# Patient Record
Sex: FEMALE | Race: BLACK OR AFRICAN AMERICAN | NOT HISPANIC OR LATINO | Employment: FULL TIME | URBAN - METROPOLITAN AREA
[De-identification: names, ages, dates, MRNs, and addresses within clinical notes are randomized per-mention and may not be internally consistent; named-entity substitution may affect disease eponyms.]

---

## 2017-04-07 PROBLEM — E28.39 PREMATURE OVARIAN FAILURE: Status: ACTIVE | Noted: 2017-04-07

## 2017-04-07 PROBLEM — Q96.9 TURNER'S SYNDROME: Status: ACTIVE | Noted: 2017-04-07

## 2017-09-22 ENCOUNTER — HOSPITAL ENCOUNTER (EMERGENCY)
Facility: OTHER | Age: 22
Discharge: HOME | End: 2017-09-22
Attending: EMERGENCY MEDICINE
Payer: MEDICAID

## 2017-09-22 VITALS
RESPIRATION RATE: 17 BRPM | HEART RATE: 118 BPM | DIASTOLIC BLOOD PRESSURE: 87 MMHG | BODY MASS INDEX: 27.82 KG/M2 | HEIGHT: 59 IN | TEMPERATURE: 97 F | OXYGEN SATURATION: 100 % | WEIGHT: 138 LBS | SYSTOLIC BLOOD PRESSURE: 123 MMHG

## 2017-09-22 DIAGNOSIS — J06.9 UPPER RESPIRATORY TRACT INFECTION, UNSPECIFIED TYPE: Primary | ICD-10-CM

## 2017-09-22 PROCEDURE — 99283 EMERGENCY DEPT VISIT LOW MDM: CPT

## 2017-09-22 RX ORDER — ONDANSETRON 4 MG/1
4 TABLET, ORALLY DISINTEGRATING ORAL EVERY 6 HOURS PRN
Qty: 10 TABLET | Refills: 0 | Status: SHIPPED | OUTPATIENT
Start: 2017-09-22 | End: 2020-05-19 | Stop reason: CLARIF

## 2017-09-22 NOTE — ED PROVIDER NOTES
Encounter Date: 9/22/2017       History     Chief Complaint   Patient presents with    Nausea     pt c/o nasuea and fever pt did not take temp. pt is afebrile in triage. she reports taking OTC advil last night      The history is provided by the patient.   URI   The primary symptoms include fever, sore throat, cough and nausea. Primary symptoms do not include fatigue, headaches, ear pain, swollen glands, wheezing, abdominal pain, vomiting, myalgias, arthralgias or rash. The current episode started yesterday. This is a new problem. The problem has not changed since onset.The fever began today. The fever has been unchanged since its onset. The fever has been present for less than 1 day. The temperature was taken by a tympanic thermometer. The maximum temperature recorded prior to her arrival was 100 to 100.9 F.   The sore throat began today. The sore throat has been unchanged since its onset. The sore throat is not accompanied by trouble swallowing, drooling, hoarse voice or stridor. The sore throat pain is at a severity of 1/10.     The cough began today. The cough is non-productive.   Nausea began today.   The onset of the illness is associated with exposure to sick contacts. Symptoms associated with the illness include chills and congestion. The illness is not associated with plugged ear sensation, facial pain or sinus pressure. The following treatments were addressed: NSAIDs were effective.     Review of patient's allergies indicates:  No Known Allergies  History reviewed. No pertinent past medical history.  History reviewed. No pertinent surgical history.  Family History   Problem Relation Age of Onset    Breast cancer Neg Hx     Colon cancer Neg Hx     Ovarian cancer Neg Hx      Social History   Substance Use Topics    Smoking status: Never Smoker    Smokeless tobacco: Never Used    Alcohol use Not on file     Review of Systems   Constitutional: Positive for chills and fever. Negative for fatigue.   HENT:  Positive for congestion and sore throat. Negative for drooling, ear pain, hoarse voice, sinus pressure and trouble swallowing.    Eyes: Negative.    Respiratory: Positive for cough. Negative for wheezing and stridor.    Cardiovascular: Negative.    Gastrointestinal: Positive for nausea. Negative for abdominal pain and vomiting.   Endocrine: Negative.    Genitourinary: Negative.    Musculoskeletal: Negative.  Negative for arthralgias and myalgias.   Skin: Negative.  Negative for rash.   Allergic/Immunologic: Negative.    Neurological: Negative.  Negative for headaches.   Hematological: Negative.    Psychiatric/Behavioral: Negative.    All other systems reviewed and are negative.      Physical Exam     Initial Vitals   BP Pulse Resp Temp SpO2   09/22/17 1010 09/22/17 1010 09/22/17 1010 09/22/17 1008 09/22/17 1008   123/87 (!) 118 17 97.2 °F (36.2 °C) 100 %      MAP       09/22/17 1010       99         Physical Exam    Nursing note and vitals reviewed.  Constitutional: Vital signs are normal. She is Obese . She is active and cooperative.   HENT:   Head: Normocephalic and atraumatic.   Eyes: Conjunctivae, EOM and lids are normal. Pupils are equal, round, and reactive to light.   Neck: Trachea normal and full passive range of motion without pain. Neck supple. No thyroid mass present.   Cardiovascular: Normal rate, regular rhythm, S1 normal, S2 normal, normal heart sounds, intact distal pulses and normal pulses.   Pulmonary/Chest: Effort normal and breath sounds normal.   Abdominal: Soft. Normal appearance, normal aorta and bowel sounds are normal. There is no hepatosplenomegaly. There is no tenderness. There is no rigidity, no rebound, no guarding, no CVA tenderness, no tenderness at McBurney's point and negative Hawthorne's sign. No hernia.       Musculoskeletal: Normal range of motion.   Lymphadenopathy:     She has no axillary adenopathy.   Neurological: She is alert and oriented to person, place, and time.   Skin: Skin  is warm, dry and intact.   Psychiatric: She has a normal mood and affect. Her speech is normal and behavior is normal. Judgment and thought content normal. Cognition and memory are normal.         ED Course   Procedures  Labs Reviewed - No data to display                            ED Course      Clinical Impression:   The encounter diagnosis was Upper respiratory tract infection, unspecified type.                           Mayo Mckenzie MD  09/22/17 4167

## 2019-01-31 ENCOUNTER — HOSPITAL ENCOUNTER (EMERGENCY)
Facility: HOSPITAL | Age: 24
Discharge: HOME | End: 2019-01-31
Attending: EMERGENCY MEDICINE

## 2019-01-31 VITALS
DIASTOLIC BLOOD PRESSURE: 92 MMHG | HEIGHT: 58 IN | BODY MASS INDEX: 32.12 KG/M2 | TEMPERATURE: 98 F | WEIGHT: 153 LBS | OXYGEN SATURATION: 100 % | HEART RATE: 73 BPM | RESPIRATION RATE: 17 BRPM | SYSTOLIC BLOOD PRESSURE: 130 MMHG

## 2019-01-31 DIAGNOSIS — H18.822 CORNEAL ABRASION OF LEFT EYE DUE TO CONTACT LENS: Primary | ICD-10-CM

## 2019-01-31 LAB
B-HCG UR QL: NEGATIVE
CTP QC/QA: YES

## 2019-01-31 PROCEDURE — 99283 EMERGENCY DEPT VISIT LOW MDM: CPT | Mod: 25,ER

## 2019-01-31 PROCEDURE — 25000003 PHARM REV CODE 250: Mod: ER | Performed by: EMERGENCY MEDICINE

## 2019-01-31 PROCEDURE — 81025 URINE PREGNANCY TEST: CPT | Mod: ER | Performed by: EMERGENCY MEDICINE

## 2019-01-31 RX ORDER — SULFACETAMIDE SODIUM 100 MG/ML
2 SOLUTION/ DROPS OPHTHALMIC ONCE
Status: DISCONTINUED | OUTPATIENT
Start: 2019-01-31 | End: 2019-01-31

## 2019-01-31 RX ORDER — PROPARACAINE HYDROCHLORIDE 5 MG/ML
1 SOLUTION/ DROPS OPHTHALMIC
Status: COMPLETED | OUTPATIENT
Start: 2019-01-31 | End: 2019-01-31

## 2019-01-31 RX ORDER — CYCLOPENTOLATE HYDROCHLORIDE 10 MG/ML
2 SOLUTION/ DROPS OPHTHALMIC
Status: COMPLETED | OUTPATIENT
Start: 2019-01-31 | End: 2019-01-31

## 2019-01-31 RX ORDER — SULFACETAMIDE SODIUM 100 MG/ML
2 SOLUTION/ DROPS OPHTHALMIC ONCE
Status: COMPLETED | OUTPATIENT
Start: 2019-01-31 | End: 2019-01-31

## 2019-01-31 RX ADMIN — PROPARACAINE HYDROCHLORIDE 1 DROP: 5 SOLUTION/ DROPS OPHTHALMIC at 12:01

## 2019-01-31 RX ADMIN — FLUORESCEIN SODIUM 1 EACH: 1 STRIP OPHTHALMIC at 12:01

## 2019-01-31 RX ADMIN — CYCLOPENTOLATE HYDROCHLORIDE 2 DROP: 10 SOLUTION/ DROPS OPHTHALMIC at 12:01

## 2019-01-31 RX ADMIN — SULFACETAMIDE SODIUM 2 DROP: 100 SOLUTION/ DROPS OPHTHALMIC at 12:01

## 2019-01-31 NOTE — DISCHARGE INSTRUCTIONS
Do not rub eye.  Wear sunglasses.  Use 1 drop of the cyclopentolate daily for 2 days.  Use 2 drops of the sulfacetamide eyedrops 4 times a day for 3 days

## 2019-01-31 NOTE — ED PROVIDER NOTES
Encounter Date: 1/31/2019    SCRIBE #1 NOTE: I, Tyson Rubio, am scribing for, and in the presence of,  Dr. Smith. I have scribed the following portions of the note - Other sections scribed: HPI, ROS, PE.       History     Chief Complaint   Patient presents with    Eye Problem     Patient reports left eye redness and drainage that began last night after leaving her contact lense in. States it was crusted over this morning.      This is a 23 y.o. female who presents to the ED with a complaint of left eye redness that began last night. Pt also reports clear drainage to the left eye and endorses a foreign body sensation. She does not have pain to her left eye. She denies visual disturbance or photophobia. Pt suspects she left her cosmetic contact in too long. She is supposed to leave the contact in for one day, but notes she had her contact in for two days. She has not put her contact back in since her eye problem began.      The history is provided by the patient.     Review of patient's allergies indicates:  No Known Allergies  History reviewed. No pertinent past medical history.  History reviewed. No pertinent surgical history.  Family History   Problem Relation Age of Onset    Breast cancer Neg Hx     Colon cancer Neg Hx     Ovarian cancer Neg Hx      Social History     Tobacco Use    Smoking status: Never Smoker    Smokeless tobacco: Never Used   Substance Use Topics    Alcohol use: Not on file    Drug use: Not on file     Review of Systems   Constitutional: Negative for fever.   HENT: Negative for rhinorrhea.    Eyes: Positive for discharge and redness. Negative for photophobia, pain and visual disturbance.   Neurological: Negative for headaches.       Physical Exam     Initial Vitals [01/31/19 1119]   BP Pulse Resp Temp SpO2   (!) 115/46 83 17 99.1 °F (37.3 °C) 100 %      MAP       --         Physical Exam    Nursing note and vitals reviewed.  Constitutional: She appears well-developed and  well-nourished. No distress.   HENT:   Head: Normocephalic and atraumatic.   Eyes: EOM are normal. Pupils are equal, round, and reactive to light. Lids are everted and swept, no foreign bodies found. Left conjunctiva is injected.   Slit lamp exam:       The left eye shows corneal abrasion and fluorescein uptake.       Neck: Normal range of motion. Neck supple.   Pulmonary/Chest: Effort normal. No respiratory distress.   Musculoskeletal: Normal range of motion.   Neurological: She is alert and oriented to person, place, and time.   Skin: Skin is warm and dry.   Psychiatric: She has a normal mood and affect.         ED Course   Procedures  Labs Reviewed   POCT URINE PREGNANCY          Imaging Results    None          Medical Decision Making:   Initial Assessment:   This is a 23 y.o. female who presents to the ED with a complaint of let eye pain and redness with clear discharge.    The pt's physical examination is significant for a corneal abrasion to the left eye and fluorescin uptake to the left eye.  Clinical Tests:   Lab Tests: Ordered  ED Management:  I will order a UPT and visual acuity screening.    Patient will be treated with sulfacetamide, cyclopentolate, proparacaine, and fluorescein.  Patient understands that she must follow up with an eye doctor within 24 hr            Scribe Attestation:   Scribe #1: I performed the above scribed service and the documentation accurately describes the services I performed. I attest to the accuracy of the note.       I, Dr. Lorna Smith, personally performed the services described in this documentation. All medical record entries made by the scribe were at my direction and in my presence.  I have reviewed the chart and agree that the record reflects my personal performance and is accurate and complete. Lorna Smith MD.  2:40 PM 01/31/2019             Clinical Impression:     1. Corneal abrasion of left eye due to contact lens                                   Lorna WALKER  MD Sarah  01/31/19 1750

## 2020-02-12 ENCOUNTER — HOSPITAL ENCOUNTER (EMERGENCY)
Facility: HOSPITAL | Age: 25
Discharge: HOME | End: 2020-02-12
Attending: INTERNAL MEDICINE
Payer: MEDICAID

## 2020-02-12 VITALS
DIASTOLIC BLOOD PRESSURE: 76 MMHG | SYSTOLIC BLOOD PRESSURE: 117 MMHG | WEIGHT: 115 LBS | TEMPERATURE: 98 F | RESPIRATION RATE: 18 BRPM | HEIGHT: 57 IN | OXYGEN SATURATION: 99 % | HEART RATE: 83 BPM | BODY MASS INDEX: 24.81 KG/M2

## 2020-02-12 DIAGNOSIS — R51.9 ACUTE NONINTRACTABLE HEADACHE, UNSPECIFIED HEADACHE TYPE: Primary | ICD-10-CM

## 2020-02-12 LAB
B-HCG UR QL: NEGATIVE
BILIRUBIN, POC UA: NEGATIVE
BLOOD, POC UA: NEGATIVE
CLARITY, POC UA: CLEAR
COLOR, POC UA: YELLOW
CTP QC/QA: YES
GLUCOSE, POC UA: NEGATIVE
KETONES, POC UA: NEGATIVE
LEUKOCYTE EST, POC UA: NEGATIVE
NITRITE, POC UA: NEGATIVE
PH UR STRIP: 7.5 [PH]
PROTEIN, POC UA: NEGATIVE
SPECIFIC GRAVITY, POC UA: 1.02
UROBILINOGEN, POC UA: 1 E.U./DL

## 2020-02-12 PROCEDURE — 99282 EMERGENCY DEPT VISIT SF MDM: CPT | Mod: ER

## 2020-02-12 PROCEDURE — 81003 URINALYSIS AUTO W/O SCOPE: CPT | Mod: ER

## 2020-02-12 PROCEDURE — 81025 URINE PREGNANCY TEST: CPT | Mod: ER | Performed by: NURSE PRACTITIONER

## 2020-02-12 NOTE — ED PROVIDER NOTES
Encounter Date: 2/12/2020    SCRIBE #1 NOTE: I, Suzanna Nolasco, am scribing for, and in the presence of,  ANGELO Deshpande. I have scribed the following portions of the note - Other sections scribed: HPI, ROS, PE.       History     Chief Complaint   Patient presents with    Headache     I keep getting headache's ,  this one started this morning.       Rivka Smith is a 24 y.o. female who presents to the ED complaining of intermittent right-sided frontal headache. Pt states her last episode was this morning that has resolved. She has been drinking apple cider vinegar and dieting in an effort to lose weight. She is in no pain at this time. Patient denies fever, fatigue, chest pain, shortness of breath, abdominal pain, nausea, vomiting, diarrhea, dysuria, hematuria, rash, numbness, weakness, tingling, or any additional complaints.  Patient denies pain at present and has not taken any medications at present.      The history is provided by the patient.     Review of patient's allergies indicates:  No Known Allergies  No past medical history on file.  No past surgical history on file.  Family History   Problem Relation Age of Onset    Breast cancer Neg Hx     Colon cancer Neg Hx     Ovarian cancer Neg Hx      Social History     Tobacco Use    Smoking status: Never Smoker    Smokeless tobacco: Never Used   Substance Use Topics    Alcohol use: Not on file    Drug use: Not on file     Review of Systems   Constitutional: Negative for chills and fever.   HENT: Negative for congestion, ear pain, rhinorrhea, sore throat and trouble swallowing.    Eyes: Negative for photophobia, pain, discharge, redness and visual disturbance.   Respiratory: Negative for cough and shortness of breath.    Cardiovascular: Negative for chest pain.   Gastrointestinal: Negative for abdominal pain, diarrhea, nausea and vomiting.   Genitourinary: Negative for decreased urine volume and dysuria.   Musculoskeletal: Negative for back pain,  neck pain and neck stiffness.   Skin: Negative for rash.   Neurological: Positive for headaches. Negative for dizziness, weakness, light-headedness and numbness.   Psychiatric/Behavioral: Negative for confusion.       Physical Exam     Initial Vitals [02/12/20 1224]   BP Pulse Resp Temp SpO2   117/76 83 18 98 °F (36.7 °C) 99 %      MAP       --         Physical Exam    Nursing note and vitals reviewed.  Constitutional: Vital signs are normal. She appears well-developed.  Non-toxic appearance. She does not appear ill.   HENT:   Head: Normocephalic and atraumatic.   Eyes: Conjunctivae and EOM are normal. Pupils are equal, round, and reactive to light.   Neck: Normal range of motion.   Cardiovascular: Normal rate and regular rhythm.   Pulmonary/Chest: Effort normal and breath sounds normal. She exhibits no tenderness.   Abdominal: Soft. There is no tenderness.   Neurological: She is alert and oriented to person, place, and time. She has normal strength. No cranial nerve deficit. Gait normal. GCS eye subscore is 4. GCS verbal subscore is 5. GCS motor subscore is 6.   Skin: Skin is warm, dry and intact. No rash noted.   Psychiatric: She has a normal mood and affect. Her speech is normal and behavior is normal. Judgment and thought content normal.         ED Course   Procedures  Labs Reviewed   POCT URINALYSIS W/O SCOPE   POCT URINE PREGNANCY   POCT URINALYSIS W/O SCOPE          Imaging Results    None          Medical Decision Making:   Clinical Tests:   Lab Tests: Ordered and Reviewed       APC / Resident Notes:   This is an evaluation of a 24 y.o. female that presents to the Emergency Department for headache that has resolved. The patient is a non-toxic, afebrile, and well appearing female. On physical exam: she is AAO, has no focal weakness or neurological deficits. Has full ROM of neck with no nuchal rigidity or meningeal signs.  There is no staggering or ataxic gait, vomiting, double vision, visual loss, slurred  speech, numbness of the face or body, weakness, clumsiness, or incoordination. No external signs of head injury or trauma. No tenderness or induration over the temples. she reports NO: history of malignancy, syncope, current nor recent pregnancy, or seizures associated with this headache. she is not immunocompromised.     Vital Signs are Reassuring. RESULTS: UPT negative, UA negative for infection    Given the above findings, my overall impression is Headache, resolved. Differential Diagnosis included but was not limited to: SAH: not sudden onset or worst headache of life; Epidural/Subdural hematoma: no head injury; CVA: normal neuro exam; Temporal Arteritis: no changes in vision, no temporal pain, headache not specifically unilateral; Glaucoma: age inconsistent, eye exam wnl; Meningitis: no neck stiffness.    ED Treatments: none, HA resolved. DC Meds: none, patient refused. Additional recommendations f/u. The diagnosis, treatment plan, instructions for follow-up and reevaluation with PCP as well as ED return precautions have been discussed with the patient and the patient has verbalized an understanding of the information. All questions or concerns have been addressed.              Scribe Attestation:   Scribe #1: I performed the above scribed service and the documentation accurately describes the services I performed. I attest to the accuracy of the note.    Physician Attestation for Scribe:  Physician Attestation Statement for Scribe: I, ANGELO Deshpande, reviewed documentation, as scribed by Suzanna Nolasco in my presence, and it is both accurate and complete.                               Clinical Impression:     1. Acute nonintractable headache, unspecified headache type      Disposition:   Disposition: Discharged  Condition: Stable                     ANGELO Rodriguez  02/12/20 1510

## 2020-05-19 ENCOUNTER — HOSPITAL ENCOUNTER (EMERGENCY)
Facility: HOSPITAL | Age: 25
Discharge: HOME | End: 2020-05-19
Attending: EMERGENCY MEDICINE
Payer: MEDICAID

## 2020-05-19 VITALS
HEART RATE: 80 BPM | OXYGEN SATURATION: 100 % | TEMPERATURE: 98 F | BODY MASS INDEX: 24.14 KG/M2 | RESPIRATION RATE: 16 BRPM | WEIGHT: 115 LBS | SYSTOLIC BLOOD PRESSURE: 107 MMHG | HEIGHT: 58 IN | DIASTOLIC BLOOD PRESSURE: 66 MMHG

## 2020-05-19 DIAGNOSIS — H16.141 KERATITIS, SUPERFICIAL, PUNCTATE, RIGHT: Primary | ICD-10-CM

## 2020-05-19 LAB
B-HCG UR QL: NEGATIVE
CTP QC/QA: YES

## 2020-05-19 PROCEDURE — 25000003 PHARM REV CODE 250: Mod: ER | Performed by: EMERGENCY MEDICINE

## 2020-05-19 PROCEDURE — 99283 EMERGENCY DEPT VISIT LOW MDM: CPT | Mod: ER

## 2020-05-19 PROCEDURE — 81025 URINE PREGNANCY TEST: CPT | Mod: ER | Performed by: EMERGENCY MEDICINE

## 2020-05-19 RX ORDER — TETRACAINE HYDROCHLORIDE 5 MG/ML
2 SOLUTION OPHTHALMIC ONCE
Status: COMPLETED | OUTPATIENT
Start: 2020-05-19 | End: 2020-05-19

## 2020-05-19 RX ORDER — ERYTHROMYCIN 5 MG/G
OINTMENT OPHTHALMIC
Qty: 1 TUBE | Refills: 0 | Status: SHIPPED | OUTPATIENT
Start: 2020-05-19

## 2020-05-19 RX ORDER — ERYTHROMYCIN 5 MG/G
OINTMENT OPHTHALMIC
Status: COMPLETED | OUTPATIENT
Start: 2020-05-19 | End: 2020-05-19

## 2020-05-19 RX ADMIN — FLUORESCEIN SODIUM 1 EACH: 1 STRIP OPHTHALMIC at 09:05

## 2020-05-19 RX ADMIN — TETRACAINE HYDROCHLORIDE 2 DROP: 5 SOLUTION OPHTHALMIC at 09:05

## 2020-05-19 RX ADMIN — ERYTHROMYCIN 1 INCH: 5 OINTMENT OPHTHALMIC at 10:05

## 2020-05-19 NOTE — ED PROVIDER NOTES
Encounter Date: 5/19/2020    SCRIBE #1 NOTE: I, Aruna Mills, am scribing for, and in the presence of,  Dr. Lyle. I have scribed the following portions of the note - Other sections scribed: HPI, ROS.       History     Chief Complaint   Patient presents with    Eye Problem     Pt c/o right eye irritation that started last night. states a clear drainage noted.      24-year-old female who presents complaining of right eye redness and irritation that began last night. States she feels like something is in her eye. Does not have contact lens in now. Denies pain with moving eyeball. Denies sinus issues, fever, cough, rhinorrhea, and vision change. Reports she had a corneal abrasion in left eye from her contact lens before. Tetanus up to date.        The history is provided by the patient. No  was used.     Review of patient's allergies indicates:  No Known Allergies  History reviewed. No pertinent past medical history.  History reviewed. No pertinent surgical history.  Family History   Problem Relation Age of Onset    Breast cancer Neg Hx     Colon cancer Neg Hx     Ovarian cancer Neg Hx      Social History     Tobacco Use    Smoking status: Never Smoker    Smokeless tobacco: Never Used   Substance Use Topics    Alcohol use: Never     Frequency: Never    Drug use: Not on file     Review of Systems   Constitutional: Negative for fever.   HENT: Negative for congestion, rhinorrhea, sinus pressure, sinus pain and sore throat.    Eyes: Positive for redness. Negative for pain and visual disturbance.   Respiratory: Negative for cough.    Cardiovascular: Negative for chest pain.   Gastrointestinal: Negative for abdominal pain.   Genitourinary: Negative for dysuria.   Musculoskeletal: Negative for back pain.   Skin: Negative for rash.   Neurological: Negative for headaches.       Physical Exam     Initial Vitals [05/19/20 0910]   BP Pulse Resp Temp SpO2   105/68 83 18 98.4 °F (36.9 °C) 100 %       MAP       --         Physical Exam    Nursing note and vitals reviewed.  Constitutional: She appears well-developed and well-nourished.   HENT:   Head: Normocephalic and atraumatic.   Right Ear: External ear normal.   Left Ear: External ear normal.   Nose: Nose normal.   Eyes:   Right eye shows scleral and conjunctival injection.  No photophobia.  Anterior chamber appears normal.  Funduscopic exam appears normal.  Extraocular moves intact without pain.  Lids are normal.  No drainage at this time.  Floor seen uptake shows stippling pattern appears to be from the superior edge of contact that is not currently in Right eye.  Likely a keratitis from drying out underneath the contact.  No foreign body identified.   Neck: Normal range of motion. Neck supple. No thyromegaly present. No JVD present.   Cardiovascular: Normal rate and regular rhythm. Exam reveals no gallop and no friction rub.    No murmur heard.  Pulmonary/Chest: Breath sounds normal. No respiratory distress.   Abdominal: Soft. Bowel sounds are normal. There is no tenderness.   Musculoskeletal: Normal range of motion. She exhibits no edema or tenderness.   Neurological: She is alert and oriented to person, place, and time. She has normal strength. GCS score is 15. GCS eye subscore is 4. GCS verbal subscore is 5. GCS motor subscore is 6.   Skin: Skin is warm and dry. Capillary refill takes less than 2 seconds.   Psychiatric: She has a normal mood and affect.         ED Course   Procedures  Labs Reviewed   POCT URINE PREGNANCY          Imaging Results    None          Medical Decision Making:   History:   Old Medical Records: I decided to obtain old medical records.  Clinical Tests:   Lab Tests: Ordered and Reviewed            Scribe Attestation:   Scribe #1: I performed the above scribed service and the documentation accurately describes the services I performed. I attest to the accuracy of the note.    I, Umesh Lyle, personally performed the services  described in this documentation. All medical record entries made by the scribe were at my direction and in my presence.  I have reviewed the chart and agree that the record reflects my personal performance and is accurate and complete                        Clinical Impression:     1. Keratitis, superficial, punctate, right                ED Disposition Condition    Discharge Stable        ED Prescriptions     Medication Sig Dispense Start Date End Date Auth. Provider    erythromycin (ROMYCIN) ophthalmic ointment Place a 1/2 inch ribbon of ointment into the lower eyelid q 6 hours for 7 days. 1 Tube 5/19/2020  Umesh Lyle MD        Follow-up Information     Follow up With Specialties Details Why Contact Info    Darek Sharif MD Ophthalmology  call today for recheck within 48 hours. [address] [facility] [address]  [phone]                                       Umesh Lyle MD  05/19/20 8174

## 2020-05-19 NOTE — DISCHARGE INSTRUCTIONS
I believe your contacts are drying out the surface of your eye underneath the contacting causing this problem.  Recommend wearing the contacts only as directed by your optometrist.

## 2020-09-28 ENCOUNTER — HOSPITAL ENCOUNTER (EMERGENCY)
Facility: HOSPITAL | Age: 25
Discharge: HOME | End: 2020-09-28
Attending: EMERGENCY MEDICINE
Payer: MEDICAID

## 2020-09-28 VITALS
BODY MASS INDEX: 24.14 KG/M2 | RESPIRATION RATE: 18 BRPM | HEIGHT: 58 IN | TEMPERATURE: 99 F | WEIGHT: 115 LBS | HEART RATE: 85 BPM | OXYGEN SATURATION: 100 % | DIASTOLIC BLOOD PRESSURE: 87 MMHG | SYSTOLIC BLOOD PRESSURE: 137 MMHG

## 2020-09-28 DIAGNOSIS — M79.603 ARM PAIN: ICD-10-CM

## 2020-09-28 DIAGNOSIS — S40.021A CONTUSION OF RIGHT UPPER EXTREMITY, INITIAL ENCOUNTER: ICD-10-CM
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
## 2020-09-28 LAB
B-HCG UR QL: NEGATIVE
CTP QC/QA: YES

## 2020-09-28 PROCEDURE — 81025 URINE PREGNANCY TEST: CPT | Mod: ER | Performed by: EMERGENCY MEDICINE

## 2020-09-28 PROCEDURE — 81003 URINALYSIS AUTO W/O SCOPE: CPT | Mod: ER

## 2020-09-28 PROCEDURE — 99284 EMERGENCY DEPT VISIT MOD MDM: CPT | Mod: 25,ER

## 2020-09-28 RX ORDER — CYCLOBENZAPRINE HCL 10 MG
10 TABLET ORAL 3 TIMES DAILY PRN
Qty: 20 TABLET | Refills: 0 | Status: SHIPPED | OUTPATIENT
Start: 2020-09-28 | End: 2020-10-05

## 2020-09-28 RX ORDER — IBUPROFEN 600 MG/1
600 TABLET ORAL EVERY 6 HOURS PRN
Qty: 20 TABLET | Refills: 0 | Status: SHIPPED | OUTPATIENT
Start: 2020-09-28 | End: 2020-10-03

## 2020-09-28 RX ORDER — ACETAMINOPHEN 500 MG
500 TABLET ORAL EVERY 4 HOURS PRN
Qty: 20 TABLET | Refills: 0 | Status: SHIPPED | OUTPATIENT
Start: 2020-09-28 | End: 2020-10-03

## 2020-09-28 NOTE — DISCHARGE INSTRUCTIONS
Take medications as prescribed. Follow up with primary care in 2 days. Return to ER for worsening symptoms or as needed.

## 2020-09-28 NOTE — Clinical Note
"Rivka Mikebaron" Luis was seen and treated in our emergency department on 9/28/2020.  She may return to work on 10/01/2020.       If you have any questions or concerns, please don't hesitate to call.      Cait Uribe PA-C"
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
## 2022-09-13 ENCOUNTER — HOSPITAL ENCOUNTER (OUTPATIENT)
Dept: CARDIOLOGY | Facility: HOSPITAL | Age: 27
Discharge: HOME | End: 2022-09-13
Attending: NURSE PRACTITIONER
Payer: MEDICAID

## 2022-09-13 DIAGNOSIS — Z09 FOLLOW-UP EXAM: ICD-10-CM

## 2022-09-13 PROCEDURE — 93010 ELECTROCARDIOGRAM REPORT: CPT | Mod: ,,, | Performed by: INTERNAL MEDICINE

## 2022-09-13 PROCEDURE — 93005 ELECTROCARDIOGRAM TRACING: CPT

## 2022-09-13 PROCEDURE — 93010 EKG 12-LEAD: ICD-10-PCS | Mod: ,,, | Performed by: INTERNAL MEDICINE

## 2023-06-13 ENCOUNTER — PATIENT MESSAGE (OUTPATIENT)
Dept: RESEARCH | Facility: HOSPITAL | Age: 28
End: 2023-06-13
Payer: MEDICAID

## 2023-06-20 ENCOUNTER — PATIENT MESSAGE (OUTPATIENT)
Dept: RESEARCH | Facility: HOSPITAL | Age: 28
End: 2023-06-20
Payer: MEDICAID

## 2024-02-06 ENCOUNTER — HOSPITAL ENCOUNTER (EMERGENCY)
Facility: HOSPITAL | Age: 29
Discharge: HOME | End: 2024-02-06
Attending: EMERGENCY MEDICINE
Payer: MEDICAID

## 2024-02-06 VITALS
DIASTOLIC BLOOD PRESSURE: 85 MMHG | HEART RATE: 82 BPM | SYSTOLIC BLOOD PRESSURE: 129 MMHG | HEIGHT: 59 IN | BODY MASS INDEX: 23.79 KG/M2 | OXYGEN SATURATION: 100 % | WEIGHT: 118 LBS | TEMPERATURE: 98 F | RESPIRATION RATE: 16 BRPM

## 2024-02-06 DIAGNOSIS — T20.10XA FACIAL BURN, FIRST DEGREE, INITIAL ENCOUNTER: Primary | ICD-10-CM

## 2024-02-06 LAB
B-HCG UR QL: NEGATIVE
CTP QC/QA: YES

## 2024-02-06 PROCEDURE — 99282 EMERGENCY DEPT VISIT SF MDM: CPT | Mod: ER

## 2024-02-06 PROCEDURE — 81025 URINE PREGNANCY TEST: CPT | Mod: ER | Performed by: EMERGENCY MEDICINE
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
## 2024-02-06 NOTE — Clinical Note
"Rivka Gallego" Luis was seen and treated in our emergency department on 2/6/2024.  She may return to work on 02/08/2024.       If you have any questions or concerns, please don't hesitate to call.      Cristian WEIR    "